# Patient Record
Sex: MALE | Race: WHITE | NOT HISPANIC OR LATINO | Employment: OTHER | URBAN - METROPOLITAN AREA
[De-identification: names, ages, dates, MRNs, and addresses within clinical notes are randomized per-mention and may not be internally consistent; named-entity substitution may affect disease eponyms.]

---

## 2018-05-02 PROBLEM — J43.1 PANLOBULAR EMPHYSEMA: Status: ACTIVE | Noted: 2018-05-02

## 2021-07-01 ENCOUNTER — PATIENT MESSAGE (OUTPATIENT)
Dept: ADMINISTRATIVE | Facility: OTHER | Age: 61
End: 2021-07-01

## 2023-01-17 ENCOUNTER — TELEPHONE (OUTPATIENT)
Dept: PODIATRY | Facility: CLINIC | Age: 63
End: 2023-01-17
Payer: MEDICARE

## 2023-01-17 DIAGNOSIS — M79.671 FOOT PAIN, BILATERAL: Primary | ICD-10-CM

## 2023-01-17 DIAGNOSIS — M79.672 FOOT PAIN, BILATERAL: Primary | ICD-10-CM

## 2023-01-17 NOTE — TELEPHONE ENCOUNTER
----- Message from Mamadou Mullins sent at 1/17/2023  2:53 PM CST -----  Type:  Patient Returning Call    Who Called:pt   Who Left Message for Patient:office  Does the patient know what this is regarding?:xray (orders aren't in)  Would the patient rather a call back or a response via [facility]? Call   Best Call Back Number: [phone]  Additional Information:

## 2023-01-18 ENCOUNTER — OFFICE VISIT (OUTPATIENT)
Dept: PODIATRY | Facility: CLINIC | Age: 63
End: 2023-01-18
Payer: MEDICARE

## 2023-01-18 VITALS — HEIGHT: 76 IN | WEIGHT: 238.19 LBS | BODY MASS INDEX: 29 KG/M2

## 2023-01-18 DIAGNOSIS — M79.672 FOOT PAIN, BILATERAL: ICD-10-CM

## 2023-01-18 DIAGNOSIS — L84 CORN OR CALLUS: Primary | ICD-10-CM

## 2023-01-18 DIAGNOSIS — M79.671 FOOT PAIN, BILATERAL: ICD-10-CM

## 2023-01-18 DIAGNOSIS — L97.519 ULCER OF BOTH FEET, UNSPECIFIED ULCER STAGE: ICD-10-CM

## 2023-01-18 DIAGNOSIS — F17.200 SMOKING: ICD-10-CM

## 2023-01-18 DIAGNOSIS — L97.529 ULCER OF BOTH FEET, UNSPECIFIED ULCER STAGE: ICD-10-CM

## 2023-01-18 DIAGNOSIS — Q66.70 CONGENITAL CAVUS FOOT: ICD-10-CM

## 2023-01-18 PROCEDURE — 99214 OFFICE O/P EST MOD 30 MIN: CPT | Mod: PBBFAC,PN | Performed by: PODIATRIST

## 2023-01-18 PROCEDURE — 99999 PR PBB SHADOW E&M-EST. PATIENT-LVL IV: CPT | Mod: PBBFAC,,, | Performed by: PODIATRIST

## 2023-01-18 PROCEDURE — 99999 PR PBB SHADOW E&M-EST. PATIENT-LVL IV: ICD-10-PCS | Mod: PBBFAC,,, | Performed by: PODIATRIST

## 2023-01-18 PROCEDURE — 11056 PARNG/CUTG B9 HYPRKR LES 2-4: CPT | Mod: PBBFAC,PN | Performed by: PODIATRIST

## 2023-01-18 PROCEDURE — 99203 PR OFFICE/OUTPT VISIT, NEW, LEVL III, 30-44 MIN: ICD-10-PCS | Mod: S$PBB,,, | Performed by: PODIATRIST

## 2023-01-18 PROCEDURE — 99203 OFFICE O/P NEW LOW 30 MIN: CPT | Mod: S$PBB,,, | Performed by: PODIATRIST

## 2023-01-18 NOTE — PROGRESS NOTES
Subjective:      Patient ID: Lorenzo Donnelly is a 62 y.o. male.    Chief Complaint: Foot Pain (Lump bilateral  x 2 months )    62 y.o. male presenting with superficial ulcers/callus formation on b/l feet. History of oral abx recently by PCP. Complains of chronic back pain. Smokes half pack a day. Denies acute foot injury. History of left calcaneal fracture which was treated non surgically. High arch foot type. Ambulating in regular walking shoes. Complains of pain over submet1 and 5 on b/l feet. Pain tends to get worse with pressure.     Review of Systems   Constitutional: Negative for decreased appetite and malaise/fatigue.   Cardiovascular:  Negative for claudication and leg swelling.   Musculoskeletal:  Positive for arthritis, back pain and stiffness. Negative for joint pain, joint swelling and muscle weakness.        B/l feet pain     Neurological:  Negative for numbness and weakness.   Psychiatric/Behavioral:  Negative for altered mental status.            Past Medical History:   Diagnosis Date    Acid reflux     Allergy     Anxiety     Arthritis     COPD (chronic obstructive pulmonary disease)     Coronary artery disease     Depression     Hyperlipidemia     Hypertension     Pneumonia        No past surgical history on file.    Family History   Problem Relation Age of Onset    Hypertension Mother     Hyperlipidemia Mother     Heart disease Mother     Osteoarthritis Mother     Cancer Father     Arthritis Sister     Rheum arthritis Sister     Osteoarthritis Sister     Amanda-Danlos syndrome Sister        Social History     Socioeconomic History    Marital status: Single   Tobacco Use    Smoking status: Every Day     Packs/day: 0.50     Years: 35.00     Pack years: 17.50     Types: Cigarettes     Start date: 2/5/1977    Smokeless tobacco: Never   Substance and Sexual Activity    Alcohol use: No     Alcohol/week: 0.0 standard drinks    Drug use: No       Current Outpatient Medications   Medication Sig  Dispense Refill    ADVAIR DISKUS 250-50 mcg/dose diskus inhaler INHALE 1 DOSE BY MOUTH TWICE DAILY 60 each 11    albuterol (PROVENTIL) 2.5 mg /3 mL (0.083 %) nebulizer solution Take 3 mLs (2.5 mg total) by nebulization every 6 (six) hours as needed for Wheezing. 120 each 11    aspirin (ECOTRIN) 81 MG EC tablet Take 81 mg by mouth once daily.      baclofen (LIORESAL) 10 MG tablet 10 mg 3 (three) times daily.      busPIRone (BUSPAR) 15 MG tablet       diazePAM (VALIUM) 10 MG Tab Take 10 mg by mouth 2 (two) times daily.  2    diclofenac (VOLTAREN) 75 MG EC tablet Take 75 mg by mouth 2 (two) times daily.      fenofibrate (TRICOR) 145 MG tablet       fluoxetine (PROZAC) 20 MG tablet 60 mg once daily.       FLUoxetine 20 MG capsule TAKE 2 CAPSULES BY MOUTH ONCE DAILY FOR 30 DAYS  3    fluticasone propionate (FLONASE) 50 mcg/actuation nasal spray USE 1 SPRAY(S) IN EACH NOSTRIL ONCE DAILY 16 g 0    furosemide (LASIX) 20 MG tablet Take 20 mg by mouth once daily.      gabapentin (NEURONTIN) 800 MG tablet Take 800 mg by mouth 3 (three) times daily.      hydrocodone-acetaminophen 10-325mg (NORCO)  mg Tab Take 1 tablet by mouth 2 (two) times daily as needed.      hydrOXYzine (ATARAX) 25 MG tablet Take 25 mg by mouth 3 (three) times daily.       hydrOXYzine (ATARAX) 50 MG tablet TAKE 1 TABLET BY MOUTH 4 TIMES DAILY FOR 30 DAYS  3    metoprolol tartrate (LOPRESSOR) 50 MG tablet Take 50 mg by mouth 2 (two) times daily.      mirtazapine (REMERON) 7.5 MG Tab TAKE 1 TABLET BY MOUTH ONCE DAILY AT BEDTIME FOR 30 DAYS  3    montelukast (SINGULAIR) 10 mg tablet Take 10 mg by mouth every evening.      montelukast (SINGULAIR) 10 mg tablet TAKE 1 TABLET BY MOUTH ONCE DAILY 30 tablet 11    OMEGA-3 FATTY ACIDS/FISH OIL (OMEGA 3 FISH OIL ORAL) Take 4 tablets by mouth once daily.      potassium chloride (MICRO-K) 10 MEQ CpSR Take 20 mEq by mouth once daily.      simvastatin (ZOCOR) 40 MG tablet Take 1 tablet by mouth once daily.       "VENTOLIN HFA 90 mcg/actuation inhaler INHALE TWO PUFFS BY MOUTH INTO THE LUNGS EVERY 4 HOURS AS NEEDED FOR WHEEZING 18 each 12    ipratropium (ATROVENT) 0.02 % nebulizer solution Take 2.5 mLs (500 mcg total) by nebulization 4 (four) times daily. 120 vial 6     No current facility-administered medications for this visit.       Review of patient's allergies indicates:   Allergen Reactions    Lincocin [lincomycin] Other (See Comments)       Vitals:    01/18/23 1336   Weight: 108 kg (238 lb 3.2 oz)   Height: 6' 4" (1.93 m)       Objective:      Physical Exam  Constitutional:       General: He is not in acute distress.     Appearance: He is well-developed.   HENT:      Nose: Nose normal.   Eyes:      Conjunctiva/sclera: Conjunctivae normal.   Pulmonary:      Effort: Pulmonary effort is normal.   Chest:      Chest wall: No tenderness.   Abdominal:      Tenderness: There is no abdominal tenderness.   Musculoskeletal:      Cervical back: Normal range of motion.   Neurological:      Mental Status: He is alert and oriented to person, place, and time.   Psychiatric:         Behavior: Behavior normal.       Vascular: Distal DP/PT pulses palpable 1/4. CRT < 3 sec to tips of toes. No vericosities noted to LEs. Hair growth present LE, warm to touch LE, No edema noted to LE.    Dermatologic:   B/l feet: callus/partial thickness ulcers noted submet1 and 5. No acute signs of infection. No drainage, no pus, no probe to bone, no sinus tract, no undermining.     Musculoskeletal: MMT 5/5 in DF/PF/Inv/Ev resistance. Passive range of motion of ankle and pedal joints is painless. No calf tenderness LE, Compartments soft/compressible. ROM of ankles with < 10 deg DF is noted bilateral.   Bilateral feet:  Cavus foot type.  Heel varus.      Neurological: Light touch, proprioception, and sharp/dull sensation are all intact. Protective threshold with the Shirley-Wienstein monofilament is intact. Vibratory sensation intact.             "   Assessment:       Encounter Diagnoses   Name Primary?    Corn or callus Yes    Ulcer of both feet, unspecified ulcer stage     Congenital cavus foot     Smoking     Foot pain, bilateral          Plan:       Lorenzo was seen today for foot pain.    Diagnoses and all orders for this visit:    Corn or callus    Ulcer of both feet, unspecified ulcer stage    Congenital cavus foot    Smoking    Foot pain, bilateral      I counseled the patient on his conditions, their implications and medical management.    62 y.o. male with bilateral feet painful callus/superficial ulcer, cavus foot type.     -bilateral feet x-ray reviewed with the patient.  High arch foot type.  History of calcaneal fracture on the left foot.   -different treatment options were discussed with the patient.  Painful callus and submet 1 and submet 5 bilaterally.  Those were sharply debrided with 15 blade.  Superficial ulcer without acute signs of infection after callus debridement.  Recommend local care with triple antibiotic.  Offloading is the key.  Recommend callus pad/U pad to appropriately offload the sub met 1 and sub met 5.     -I reviewed imaging, clinical history, and diagnosis as above with the patient. I attempted to use layman's terms to educate the patient as well as utilize foot models and/or pictures. I personally went through imaging with the patient.    -The nature of the condition, options for management, as well as potential risks and complications were discussed in detail with patient. Patient was amenable to my recommendations and left my office fully informed and will follow up as instructed or sooner if necessary.    -It was discussed the importance of wearing shoes with adequate room in toe box to accommodate toe deformities. Recommended New [employer] shoe brands with adequate arch supports to alleviate abnormal pressure and improve stability of foot while walking. Avoid flat shoes and barefoot walking as these will  exacerbate or worsen symptoms.   -Discuss in detail the harmful effects of nicotine/tobacco/cigarette smoking, especially in relation to the lower extremity. Recommend consultation with primary care provider for further discussed of smoking cessation methods. Smoking & Tobacco use cessation couseling was rendered at today's visit; intermediate, bewteen 3 and 10 minutes.  -f/u prn     Note dictated with voice recognition software, please excuse any grammatical errors.